# Patient Record
Sex: FEMALE | Race: WHITE | NOT HISPANIC OR LATINO | Employment: FULL TIME | URBAN - METROPOLITAN AREA
[De-identification: names, ages, dates, MRNs, and addresses within clinical notes are randomized per-mention and may not be internally consistent; named-entity substitution may affect disease eponyms.]

---

## 2017-11-10 ENCOUNTER — ALLSCRIPTS OFFICE VISIT (OUTPATIENT)
Dept: OTHER | Facility: OTHER | Age: 37
End: 2017-11-10

## 2017-12-29 ENCOUNTER — GENERIC CONVERSION - ENCOUNTER (OUTPATIENT)
Dept: OTHER | Facility: OTHER | Age: 37
End: 2017-12-29

## 2017-12-29 ENCOUNTER — ALLSCRIPTS OFFICE VISIT (OUTPATIENT)
Dept: OTHER | Facility: OTHER | Age: 37
End: 2017-12-29

## 2018-01-13 VITALS
OXYGEN SATURATION: 99 % | BODY MASS INDEX: 34.17 KG/M2 | DIASTOLIC BLOOD PRESSURE: 86 MMHG | HEART RATE: 68 BPM | WEIGHT: 244.06 LBS | SYSTOLIC BLOOD PRESSURE: 164 MMHG | HEIGHT: 71 IN

## 2018-01-13 NOTE — CONSULTS
Chief Complaint  Chief Complaint Free Text Note Form: Rupture Right Eardrum/Ref by Dr Moisés Cardona      History of Present Illness  HPI: 60-year-old female presents for evaluation of right-sided hearing loss and history of ear infection  She presented to her primary care in October 13 with a right-sided ear infection  She was given cephalexin  She returned on the 30th when her pain had resolved but the hearing loss had not  She was told by her PA that she had a right-sided eardrum perforation  She denies any drainage, further ear pain, or previous ear surgery  Patient works as a psychiatric nurse  Review of Systems  Complete ENT ROS [facility]:   Eyes: No complaints of itching, excessive tearing or vision changes  Ears: ruptured rt eardrum  Nose: No nasal obstruction, no discharge or runniness, no bleeding, no dryness, no sneezing and no loss of smell  Mouth: No sores in mouth, no altered taste, no dental problems  Throat: No complaints of throat pain, no difficulty swallowing, no hoarseness  Neck: No neck soreness, no neck pain, no neck lumps or swelling  Genitourinary: No complaints of dysuria, flank pain or frequent urination  Cardiovascular: No complaints of chest pain or palpitations  Respiratory: No complaints of shortness of breath, cough or wheezing  Gastrointestinal: No complaints of heartburn, nausea/vomiting, or constipation  Neurological: No complaints of headache, convulsions or memory loss  ROS Reviewed:   ROS reviewed  Past Medical History  Past Medical History Reviewed: The past medical history was reviewed and updated today  Surgical History  Surgical History Reviewed: The surgical history was reviewed and updated today  Family History  Family History Reviewed: The family history was reviewed and updated today  Social History    · Nonsmoker (V49 89) (Z78 9)  Social History Reviewed: The social history was reviewed and updated today   The social history was reviewed and is unchanged  Current Meds   1  DilTIAZem HCl  MG Oral Capsule Extended Release 24 Hour; Therapy: [id number] to Recorded   2  Synthroid 137 MCG Oral Tablet; Therapy: [id number] to Recorded    Allergies    1  Amoxicillin-Pot Clavulanate TABS   2  Clindamycin    Vitals  Signs   Recorded: [id number] 12:33PM   Heart Rate: 68  Systolic: 836, LUE, Standing  Diastolic: 86, LUE, Standing  Height: 5 ft 11 in  Weight: 244 lb 1 oz  BMI Calculated: 34 04  BSA Calculated: 2 3  O2 Saturation: 99    Physical Exam    Constitutional:   General appearance: Well developed, well nourished  Ability to communicate: Voice normal  Speech normal    Head and Face:   Head and face: Head normocephalic, atraumatic with no lesions or palpable masses  Submandibular glands and parotid glands: non tender, no masses  Eyes:   Test of Ocular Motility: Gaze normal  No nystagmus  Ears:   Otoscopic examination: Abnormal     Hearing: Normal    Nose:   External auditory canals: No cerumen impaction noted, no drainage observed, no edema noted in EAC, no exostoses present, no osteoma present, no tenderness noted  External Inspection of Nose: No deformities observed, no deviation of bone structure, no skin lesion present, no swelling present  Nares are symmetric, no deviation of caudal portion of septum  Nasal Mucosa: No congestion observed, no mucosal lesion or masses present, no ulcerations observed  Cartilaginous Septum: midline, no bleeding noted, no crusting present, no perforation noted  Turbinates: No hypertrophy or inflammation noted  Mouth: Inspection of Lips, Teeth, Gums: Lips normal in color, moist, no cracks or lesions  No loose teeth, no missing teeth  Gingiva: no bleeding observed, no inflammation present  Hard Palate: no asymmetry observed, no torus present  Soft palate normal with no ulcers noted     Throat:   Examination of Oropharynx: Oral Mucosa: no masses, lesions, leukoplakia, or scarring  Normal Luisa's ducts, pink and moist, no discoloration noted  Floor of mouth: normal Warthin's ducts, no lesions, ulcerations, leukoplakia or torus mandibularis  Tonsils: no hypertrophy or ulcerations noted  Tongue: normal mobility, surfaces without fissures, leukoplakia, ulceration or masses, not enlarged, no pallor noted, no white patches present  Neck:   Examination of Neck: No decreased range of motion, trachea midline  Examination of Thyroid: Normal size, non-tender, no palpable masses  Lymphatic:   Palpation of Lymph Nodes: Neck: No generalized lymphadenopathy  Neurological/Psychiatric:   Cranial nerves II-VII grossly intact  Oriented to person, place, and time  Cooperative, in no acute distress  Procedure  Procedure: Nasal endoscopy    Indications: Evaluation of the nasal cavity     Procedure in detail: After informed verbal consent was obtained the nose was anesthetized with topical lidocaine and phenylephrine  After adequate time for anesthesia the nose was evaluated using the endoscope including the middle meatus bilaterally, the inferior and middle turbinates, and the sphenoethmoid recess with the below findings  The patient tolerated the procedure well  Findings: No mucopurulence or polyposis throughout  No lesions or masses  Assessment    1  Nonsmoker (V49 89) (Z78 9)   2  Right chronic serous otitis media (381 10) (H65 21)    Plan    1  Comprehensive Audiogram with Tympanometry; Status:Active; Requested   for:[id number];     Discussion/Summary  Discussion Summary:   We discussed options of tube placement into her right ear  We will wait another 2 weeks as she is not quite to one month after treatment of her upper respiratory tract infection  [facility] and audi[facility]     We discussed Valsalva maneuver to attempt to clear fluid        Signatures   Electronically signed by : EFRA Chandra ; Nov 10 2017  1:13PM EST                       (Author)

## 2018-01-24 VITALS
HEART RATE: 69 BPM | DIASTOLIC BLOOD PRESSURE: 96 MMHG | WEIGHT: 241.06 LBS | BODY MASS INDEX: 33.75 KG/M2 | HEIGHT: 71 IN | OXYGEN SATURATION: 99 % | SYSTOLIC BLOOD PRESSURE: 164 MMHG

## 2018-03-07 NOTE — PROGRESS NOTES
Assessment    1  Right chronic serous otitis media (381 10) (H65 21)    Chief Complaint  Chief Complaint Free Text Note Form: F/U TUBE IN RIGHT EAR/REF BY DR Tami Fleming      History of Present Illness  HPI: 60-year-old female returns for reevaluation after right PE tube placement  She is feeling much better  Her hearing was completely returned  No ear drainage  No ear fullness  Review of Systems  Complete ENT ROS [facility]:   Eyes: No complaints of itching, excessive tearing or vision changes  Ears: F/UI TUBE IN RIGHT EAR  Nose: No nasal obstruction, no discharge or runniness, no bleeding, no dryness, no sneezing and no loss of smell  Mouth: No sores in mouth, no altered taste, no dental problems  Throat: No complaints of throat pain, no difficulty swallowing, no hoarseness  Neck: No neck soreness, no neck pain, no neck lumps or swelling  Genitourinary: No complaints of dysuria, flank pain or frequent urination  Cardiovascular: No complaints of chest pain or palpitations  Respiratory: No complaints of shortness of breath, cough or wheezing  Gastrointestinal: No complaints of heartburn, nausea/vomiting, or constipation  Neurological: No complaints of headache, convulsions or memory loss  ROS Reviewed:   ROS reviewed  Active Problems    1  Right chronic serous otitis media (381 10) (H65 21)    Social History    · Nonsmoker (V49 89) (Z78 9)    Current Meds   1  DilTIAZem HCl  MG Oral Capsule Extended Release 24 Hour; Therapy: [id number] to Recorded   2  Synthroid 137 MCG Oral Tablet; Therapy: 37DII8300 to Recorded    Allergies    1  Amoxicillin-Pot Clavulanate TABS   2   Clindamycin    Vitals  Signs   Recorded: 54Xsm6641 07:51AM   Heart Rate: 69  Systolic: 024, LUE, Sitting  Diastolic: 96, LUE, Sitting  Height: 5 ft 11 in  Weight: 241 lb 1 oz  BMI Calculated: 33 62  BSA Calculated: 2 28  O2 Saturation: 99    Physical Exam    Constitutional:   General appearance: Well developed, well nourished  Ability to communicate: Voice normal  Speech normal    Head and Face:   Head and face: Head normocephalic, atraumatic with no lesions or palpable masses  Submandibular glands and parotid glands: non tender, no masses  Eyes:   Test of Ocular Motility: Gaze normal  No nystagmus  Ears:   Otoscopic Examination: Tympanic membranes intact and normal in appearance, no retraction of tympanic membranes observed, no serous effusion observed, no evidence of tympanosclerosis  PE tube in place in the left anterior inferior quadrant  Hearing: Normal    Nose:   External auditory canals: No cerumen impaction noted, no drainage observed, no edema noted in EAC, no exostoses present, no osteoma present, no tenderness noted  External Inspection of Nose: No deformities observed, no deviation of bone structure, no skin lesion present, no swelling present  Nares are symmetric, no deviation of caudal portion of septum  Nasal Mucosa: No congestion observed, no mucosal lesion or masses present, no ulcerations observed  Cartilaginous Septum: midline, no bleeding noted, no crusting present, no perforation noted  Turbinates: No hypertrophy or inflammation noted  Mouth: Inspection of Lips, Teeth, Gums: Lips normal in color, moist, no cracks or lesions  No loose teeth, no missing teeth  Gingiva: no bleeding observed, no inflammation present  Hard Palate: no asymmetry observed, no torus present  Soft palate normal with no ulcers noted  Throat:   Examination of Oropharynx: Oral Mucosa: no masses, lesions, leukoplakia, or scarring  Normal Luisa's ducts, pink and moist, no discoloration noted  Floor of mouth: normal Warthin's ducts, no lesions, ulcerations, leukoplakia or torus mandibularis  Tonsils: no hypertrophy or ulcerations noted  Tongue: normal mobility, surfaces without fissures, leukoplakia, ulceration or masses, not enlarged, no pallor noted, no white patches present     Neck:   Examination of Neck: No decreased range of motion, trachea midline  Examination of Thyroid: Normal size, non-tender, no palpable masses  Lymphatic:   Palpation of Lymph Nodes: Neck: No generalized lymphadenopathy  Neurological/Psychiatric:   Cranial nerves II-VII grossly intact  Oriented to person, place, and time  Cooperative, in no acute distress  Discussion/Summary  Discussion Summary:   She is doing very well  We will see her back in 6 months for reevaluation or sooner should she have any further problems        Signatures   Electronically signed by : Shelli Boas, M D ; Dec 29 2017  8:24AM EST                       (Author)

## 2018-03-07 NOTE — CONSULTS
Plan    1  Comprehensive Audiogram with Tympanometry; Status:Active; Requested   for:10Nov2017;     Assessment    1  Nonsmoker (V49 89) (Z78 9)   2  Right chronic serous otitis media (381 10) (N69 21)    Chief Complaint  Chief Complaint Free Text Note Form: Rupture Right Eardrum/Ref by Dr Brenda Mathis      History of Present Illness  HPI: 64-year-old female presents for evaluation of right-sided hearing loss and history of ear infection  She presented to her primary care in October 13 with a right-sided ear infection  She was given cephalexin  She returned on the 30th when her pain had resolved but the hearing loss had not  She was told by her PA that she had a right-sided eardrum perforation  She denies any drainage, further ear pain, or previous ear surgery  Patient works as a psychiatric nurse  Review of Systems  Complete ENT ROS [facility]:   Eyes: No complaints of itching, excessive tearing or vision changes  Ears: ruptured rt eardrum  Nose: No nasal obstruction, no discharge or runniness, no bleeding, no dryness, no sneezing and no loss of smell  Mouth: No sores in mouth, no altered taste, no dental problems  Throat: No complaints of throat pain, no difficulty swallowing, no hoarseness  Neck: No neck soreness, no neck pain, no neck lumps or swelling  Genitourinary: No complaints of dysuria, flank pain or frequent urination  Cardiovascular: No complaints of chest pain or palpitations  Respiratory: No complaints of shortness of breath, cough or wheezing  Gastrointestinal: No complaints of heartburn, nausea/vomiting, or constipation  Neurological: No complaints of headache, convulsions or memory loss  ROS Reviewed:   ROS reviewed  Past Medical History  Past Medical History Reviewed: The past medical history was reviewed and updated today  Surgical History  Surgical History Reviewed: The surgical history was reviewed and updated today         Family History  Family History Reviewed: The family history was reviewed and updated today  Social History    · Nonsmoker (V49 89) (Z78 9)  Social History Reviewed: The social history was reviewed and updated today  The social history was reviewed and is unchanged  Current Meds   1  DilTIAZem HCl  MG Oral Capsule Extended Release 24 Hour; Therapy: [id number] to Recorded   2  Synthroid 137 MCG Oral Tablet; Therapy: [id number] to Recorded    Allergies    1  Amoxicillin-Pot Clavulanate TABS   2  Clindamycin    Vitals  Signs   Recorded: [id number] 12:33PM   Heart Rate: 68  Systolic: 161, LUE, Standing  Diastolic: 86, LUE, Standing  Height: 5 ft 11 in  Weight: 244 lb 1 oz  BMI Calculated: 34 04  BSA Calculated: 2 3  O2 Saturation: 99    Physical Exam    Constitutional:   General appearance: Well developed, well nourished  Ability to communicate: Voice normal  Speech normal    Head and Face:   Head and face: Head normocephalic, atraumatic with no lesions or palpable masses  Submandibular glands and parotid glands: non tender, no masses  Eyes:   Test of Ocular Motility: Gaze normal  No nystagmus  Ears:   Otoscopic examination: Abnormal     Hearing: Normal    Nose:   External auditory canals: No cerumen impaction noted, no drainage observed, no edema noted in EAC, no exostoses present, no osteoma present, no tenderness noted  External Inspection of Nose: No deformities observed, no deviation of bone structure, no skin lesion present, no swelling present  Nares are symmetric, no deviation of caudal portion of septum  Nasal Mucosa: No congestion observed, no mucosal lesion or masses present, no ulcerations observed  Cartilaginous Septum: midline, no bleeding noted, no crusting present, no perforation noted  Turbinates: No hypertrophy or inflammation noted  Mouth: Inspection of Lips, Teeth, Gums: Lips normal in color, moist, no cracks or lesions  No loose teeth, no missing teeth   Gingiva: no bleeding observed, no inflammation present  Hard Palate: no asymmetry observed, no torus present  Soft palate normal with no ulcers noted  Throat:   Examination of Oropharynx: Oral Mucosa: no masses, lesions, leukoplakia, or scarring  Normal Luisa's ducts, pink and moist, no discoloration noted  Floor of mouth: normal Warthin's ducts, no lesions, ulcerations, leukoplakia or torus mandibularis  Tonsils: no hypertrophy or ulcerations noted  Tongue: normal mobility, surfaces without fissures, leukoplakia, ulceration or masses, not enlarged, no pallor noted, no white patches present  Neck:   Examination of Neck: No decreased range of motion, trachea midline  Examination of Thyroid: Normal size, non-tender, no palpable masses  Lymphatic:   Palpation of Lymph Nodes: Neck: No generalized lymphadenopathy  Neurological/Psychiatric:   Cranial nerves II-VII grossly intact  Oriented to person, place, and time  Cooperative, in no acute distress  Procedure  Procedure: Nasal endoscopy    Indications: Evaluation of the nasal cavity     Procedure in detail: After informed verbal consent was obtained the nose was anesthetized with topical lidocaine and phenylephrine  After adequate time for anesthesia the nose was evaluated using the endoscope including the middle meatus bilaterally, the inferior and middle turbinates, and the sphenoethmoid recess with the below findings  The patient tolerated the procedure well  Findings: No mucopurulence or polyposis throughout  No lesions or masses  Discussion/Summary  Discussion Summary:   We discussed options of tube placement into her right ear  We will wait another 2 weeks as she is not quite to one month after treatment of her upper respiratory tract infection  [place] for possible right-sided PE tube placement and audiogram     We discussed Valsalva maneuver to attempt to clear fluid        Signatures   Electronically signed by : Marylee Burton, M D ; Nov 10 2017  1:13PM EST                       (Author)

## 2018-06-26 RX ORDER — ASPIRIN 81 MG/1
81 TABLET, CHEWABLE ORAL
COMMUNITY

## 2018-06-26 RX ORDER — DILTIAZEM HYDROCHLORIDE 240 MG/1
240 CAPSULE, COATED, EXTENDED RELEASE ORAL
COMMUNITY
Start: 2018-03-07 | End: 2018-06-29

## 2018-06-26 RX ORDER — LEVOTHYROXINE SODIUM 0.12 MG/1
125 TABLET ORAL
COMMUNITY
Start: 2018-03-07

## 2018-06-26 RX ORDER — LEVOTHYROXINE SODIUM 137 UG/1
TABLET ORAL
COMMUNITY
Start: 2017-11-10 | End: 2018-06-29

## 2018-06-26 RX ORDER — VALSARTAN AND HYDROCHLOROTHIAZIDE 160; 25 MG/1; MG/1
1 TABLET ORAL
COMMUNITY
Start: 2017-04-05 | End: 2018-06-29

## 2018-06-26 RX ORDER — VALSARTAN AND HYDROCHLOROTHIAZIDE 80; 12.5 MG/1; MG/1
1 TABLET, FILM COATED ORAL
COMMUNITY
Start: 2018-03-07

## 2018-06-26 RX ORDER — AMOXICILLIN 250 MG
CAPSULE ORAL
COMMUNITY
Start: 2010-05-07

## 2018-06-26 RX ORDER — LEVOTHYROXINE SODIUM 112 UG/1
112 TABLET ORAL
COMMUNITY

## 2018-06-29 ENCOUNTER — OFFICE VISIT (OUTPATIENT)
Dept: OTOLARYNGOLOGY | Facility: CLINIC | Age: 38
End: 2018-06-29
Payer: COMMERCIAL

## 2018-06-29 VITALS
HEART RATE: 66 BPM | HEIGHT: 71 IN | BODY MASS INDEX: 33.74 KG/M2 | DIASTOLIC BLOOD PRESSURE: 78 MMHG | RESPIRATION RATE: 14 BRPM | WEIGHT: 241 LBS | SYSTOLIC BLOOD PRESSURE: 132 MMHG

## 2018-06-29 DIAGNOSIS — Z96.22 PATENT PRESSURE EQUALIZATION (PE) TUBE ON RIGHT SIDE: ICD-10-CM

## 2018-06-29 DIAGNOSIS — H92.01 OTALGIA, RIGHT: ICD-10-CM

## 2018-06-29 DIAGNOSIS — H65.21 RIGHT CHRONIC SEROUS OTITIS MEDIA: Primary | ICD-10-CM

## 2018-06-29 PROCEDURE — 69210 REMOVE IMPACTED EAR WAX UNI: CPT | Performed by: NURSE PRACTITIONER

## 2018-06-29 PROCEDURE — 99214 OFFICE O/P EST MOD 30 MIN: CPT | Performed by: NURSE PRACTITIONER

## 2018-06-29 NOTE — PROGRESS NOTES
Priya Mercado is a 40 y  o female who presents for re-evaluation of right PE tube for history of chronic right serous otitis media  Relates occasional intermittent mosqueda inner ear pain of right ear that's recurring about 3 times per week over the last couple of weeks  Denies otorrhea, left otalgia, hearing changes, fever and ear fullness and pressure  Otherwise relates doing well  No past medical history on file  /78 (BP Location: Right arm, Patient Position: Sitting, Cuff Size: Standard)   Pulse 66   Resp 14   Ht 5' 11" (1 803 m)   Wt 109 kg (241 lb)   BMI 33 61 kg/m²       Physical Exam   Constitutional: Oriented to person, place, and time  Well-developed and well-nourished, no apparent distress, non-toxic appearance  Cooperative, able to hear and answer questions without difficulty  Voice: Normal voice quality  Head: Normocephalic, atraumatic  No scars, masses or lesions  Face: Symmetric, no edema, no sinus tenderness  Eyes: Vision grossly intact, extra-ocular movement intact  Right Ear: External ear normal   Auditory canal is impacted with cerumen  Right TM: patent pe tube in anterior inferior quadrant - working it's way out  No post-auricular erythema or tenderness  Left Ear: External ear normal   Auditory canal clear  No post-auricular erythema or tenderness  Nose: Septum midline, nares clear  Mucosa moist, turbinates well appearing  No crusting, polyps or discharge evident  Oral cavity: Dentition intact  Mucosa moist, lips normal   Tongue mobile, floor of mouth normal   Hard palate unremarkable  No masses or lesions  Oropharynx: Uvula is midline, soft palate normal   Unremarkable oropharyngeal inlet  Tonsils unremarkable  Posterior pharyngeal wall clear  No masses or lesions  Salivary glands:  Parotid glands and submandibular glands symmetric, no enlargement or tenderness  Neck: Normal laryngeal elevation with swallow  Trachea midline  No masses or lesions   No palpable adenopathy  Thyroid: normal in size, unremarkable without tenderness or palpable nodules  Pulmonary/Chest: Normal effort and rate  No respiratory distress  Musculoskeletal: Normal range of motion  Neurological: Cranial nerves 2-12 intact  Skin: Skin is warm and dry  Psychiatric: Normal mood and affect  Procedure: Cerumen debridement of right EAC    Indications: Cerumen impaction of right EAC    Procedure in detail: After informed verbal consent was obtained the ear was visualized using microscopy  Using alligator forceps the cerumen was debrided and the findings below were seen  The patient tolerated the procedure well  FINDINGS: Unable to remove cerumen secondary to patient discomfort  Right TM with patent pe tube working it's way out of the anterior inferior quadrant  A/P:   Right patent PE tube: We discussed ongoing management of right patent PE tube with cerumen surrounding it in setting of intermittent sharp right inner ear otalgia  PE tube placed for history of chronic right serous otitis media  Educated she is experiencing otalgia as tube is likely working it's way out  Reassured it is patent and in place, and no active ear infection  Educated she may experience ear pain and drainage when tube falls out  Educated on water precautions  Follow up in 1 month or sooner with any concerns

## 2018-06-29 NOTE — LETTER
June 29, 2018     Neel Marie, DO  [address]    Patient: Eusebia Breaux   YOB: 1980   Date of Visit: 6/29/2018       Dear Dr Trent Sánchez: Thank you for referring Eusebia Breaux to me for evaluation  Below are my notes for this consultation  If you have questions, please do not hesitate to call me  I look forward to following your patient along with you  Sincerely,        CONCHIS Murdock        CC: No Recipients  CONCHIS Murdock  6/29/2018 10:40 AM  Sign at close encounter  Eusebia Breaux is a 40 y  o female who presents for re-evaluation of right PE tube for history of chronic right serous otitis media  Relates occasional intermittent mosqueda inner ear pain of right ear that's recurring about 3 times per week over the last couple of weeks  Denies otorrhea, left otalgia, hearing changes, fever and ear fullness and pressure  Otherwise relates doing well  No past medical history on file  /78 (BP Location: Right arm, Patient Position: Sitting, Cuff Size: Standard)   Pulse 66   Resp 14   Ht 5' 11" (1 803 m)   Wt 109 kg (241 lb)   BMI 33 61 kg/m²        Physical Exam   Constitutional: Oriented to person, place, and time  Well-developed and well-nourished, no apparent distress, non-toxic appearance  Cooperative, able to hear and answer questions without difficulty  Voice: Normal voice quality  Head: Normocephalic, atraumatic  No scars, masses or lesions  Face: Symmetric, no edema, no sinus tenderness  Eyes: Vision grossly intact, extra-ocular movement intact  Right Ear: External ear normal   Auditory canal is impacted with cerumen  Right TM: patent pe tube in anterior inferior quadrant - working it's way out  No post-auricular erythema or tenderness  Left Ear: External ear normal   Auditory canal clear  No post-auricular erythema or tenderness  Nose: Septum midline, nares clear  Mucosa moist, turbinates well appearing    No crusting, polyps or discharge evident  Oral cavity: Dentition intact  Mucosa moist, lips normal   Tongue mobile, floor of mouth normal   Hard palate unremarkable  No masses or lesions  Oropharynx: Uvula is midline, soft palate normal   Unremarkable oropharyngeal inlet  Tonsils unremarkable  Posterior pharyngeal wall clear  No masses or lesions  Salivary glands:  Parotid glands and submandibular glands symmetric, no enlargement or tenderness  Neck: Normal laryngeal elevation with swallow  Trachea midline  No masses or lesions  No palpable adenopathy  Thyroid: normal in size, unremarkable without tenderness or palpable nodules  Pulmonary/Chest: Normal effort and rate  No respiratory distress  Musculoskeletal: Normal range of motion  Neurological: Cranial nerves 2-12 intact  Skin: Skin is warm and dry  Psychiatric: Normal mood and affect  Procedure: Cerumen debridement of right EAC    Indications: Cerumen impaction of right EAC    Procedure in detail: After informed verbal consent was obtained the ear was visualized using microscopy  Using alligator forceps the cerumen was debrided and the findings below were seen  The patient tolerated the procedure well  FINDINGS: Unable to remove cerumen secondary to patient discomfort  Right TM with patent pe tube working it's way out of the anterior inferior quadrant  A/P:   Right patent PE tube: We discussed ongoing management of right patent PE tube with cerumen surrounding it in setting of intermittent sharp right inner ear otalgia  PE tube placed for history of chronic right serous otitis media  Educated she is experiencing otalgia as tube is likely working it's way out  Reassured it is patent and in place, and no active ear infection  Educated she may experience ear pain and drainage when tube falls out  Educated on water precautions  Follow up in 1 month or sooner with any concerns

## 2018-06-29 NOTE — PROGRESS NOTES
Review of Systems   Constitutional: Negative  HENT: Negative  Eyes: Negative  Respiratory: Negative  Cardiovascular: Negative  Gastrointestinal: Negative  Endocrine: Negative  Genitourinary: Negative  Musculoskeletal: Negative  Skin: Negative  Allergic/Immunologic: Negative  Neurological: Negative  Hematological: Negative  Psychiatric/Behavioral: Negative

## 2019-05-17 ENCOUNTER — APPOINTMENT (OUTPATIENT)
Dept: URGENT CARE | Facility: CLINIC | Age: 39
End: 2019-05-17
Payer: OTHER MISCELLANEOUS

## 2019-05-17 ENCOUNTER — APPOINTMENT (OUTPATIENT)
Dept: RADIOLOGY | Facility: CLINIC | Age: 39
End: 2019-05-17
Payer: OTHER MISCELLANEOUS

## 2019-05-17 DIAGNOSIS — R51.9 FACIAL PAIN, ACUTE: Primary | ICD-10-CM

## 2019-05-17 DIAGNOSIS — R51.9 FACIAL PAIN, ACUTE: ICD-10-CM

## 2019-05-17 PROCEDURE — G0382 LEV 3 HOSP TYPE B ED VISIT: HCPCS | Performed by: PHYSICIAN ASSISTANT

## 2019-05-17 PROCEDURE — 70150 X-RAY EXAM OF FACIAL BONES: CPT

## 2019-05-17 PROCEDURE — 99283 EMERGENCY DEPT VISIT LOW MDM: CPT | Performed by: PHYSICIAN ASSISTANT

## 2021-09-16 DIAGNOSIS — Z12.31 ENCOUNTER FOR SCREENING MAMMOGRAM FOR MALIGNANT NEOPLASM OF BREAST: ICD-10-CM

## 2021-09-27 ENCOUNTER — HOSPITAL ENCOUNTER (OUTPATIENT)
Dept: RADIOLOGY | Facility: CLINIC | Age: 41
Discharge: HOME | End: 2021-09-27
Payer: COMMERCIAL

## 2021-09-27 VITALS — HEIGHT: 71 IN | WEIGHT: 240 LBS | BODY MASS INDEX: 33.6 KG/M2

## 2021-09-27 DIAGNOSIS — Z12.31 ENCOUNTER FOR SCREENING MAMMOGRAM FOR MALIGNANT NEOPLASM OF BREAST: ICD-10-CM

## 2021-09-27 PROCEDURE — 77067 SCR MAMMO BI INCL CAD: CPT

## 2021-09-27 PROCEDURE — 77063 BREAST TOMOSYNTHESIS BI: CPT

## 2022-06-29 ENCOUNTER — OFFICE VISIT (OUTPATIENT)
Dept: URGENT CARE | Facility: MEDICAL CENTER | Age: 42
End: 2022-06-29
Payer: COMMERCIAL

## 2022-06-29 VITALS
TEMPERATURE: 98.6 F | BODY MASS INDEX: 36.4 KG/M2 | WEIGHT: 260 LBS | OXYGEN SATURATION: 99 % | SYSTOLIC BLOOD PRESSURE: 130 MMHG | HEIGHT: 71 IN | DIASTOLIC BLOOD PRESSURE: 80 MMHG | HEART RATE: 94 BPM | RESPIRATION RATE: 18 BRPM

## 2022-06-29 DIAGNOSIS — R39.9 UTI SYMPTOMS: ICD-10-CM

## 2022-06-29 DIAGNOSIS — N39.0 URINARY TRACT INFECTION WITHOUT HEMATURIA, SITE UNSPECIFIED: Primary | ICD-10-CM

## 2022-06-29 LAB
SL AMB  POCT GLUCOSE, UA: ABNORMAL
SL AMB LEUKOCYTE ESTERASE,UA: ABNORMAL
SL AMB POCT BILIRUBIN,UA: ABNORMAL
SL AMB POCT BLOOD,UA: ABNORMAL
SL AMB POCT CLARITY,UA: CLEAR
SL AMB POCT COLOR,UA: YELLOW
SL AMB POCT KETONES,UA: ABNORMAL
SL AMB POCT NITRITE,UA: ABNORMAL
SL AMB POCT PH,UA: 6
SL AMB POCT SPECIFIC GRAVITY,UA: 1
SL AMB POCT URINE PROTEIN: ABNORMAL
SL AMB POCT UROBILINOGEN: 0.2

## 2022-06-29 PROCEDURE — 99212 OFFICE O/P EST SF 10 MIN: CPT | Performed by: PHYSICIAN ASSISTANT

## 2022-06-29 PROCEDURE — 87086 URINE CULTURE/COLONY COUNT: CPT | Performed by: PHYSICIAN ASSISTANT

## 2022-06-29 PROCEDURE — 81002 URINALYSIS NONAUTO W/O SCOPE: CPT | Performed by: PHYSICIAN ASSISTANT

## 2022-06-29 RX ORDER — CIPROFLOXACIN 250 MG/1
TABLET, FILM COATED ORAL
Qty: 7 TABLET | Refills: 0 | Status: SHIPPED | OUTPATIENT
Start: 2022-06-29 | End: 2022-07-06

## 2022-06-29 RX ORDER — FLUCONAZOLE 50 MG/1
TABLET ORAL
Qty: 2 TABLET | Refills: 0 | Status: SHIPPED | OUTPATIENT
Start: 2022-06-29 | End: 2022-06-30

## 2022-06-29 NOTE — PROGRESS NOTES
[employer] [facility]        NAME: Duane Filippo is a 37 y o  female  : 1980    MRN: [id number]  DATE: 2022  TIME: 5:44 PM    Assessment and Plan   Urinary tract infection without hematuria, site unspecified [N39 0]  1  Urinary tract infection without hematuria, site unspecified  ciprofloxacin (CIPRO) 250 mg tablet    fluconazole (DIFLUCAN) 50 mg tablet   2  UTI symptoms  POCT urine dip    Urine culture         Patient Instructions       Follow up with PCP in 3-5 days  Proceed to  ER if symptoms worsen  Chief Complaint     Chief Complaint   Patient presents with    Possible UTI     Right sided flank pain, frequent urination started today          History of Present Illness       Patient presents with right-sided flank pain frequent urination which started earlier today  She is very concerned as she has polycystic kidney disease with a GFR of 10  No fever chills nausea or vomiting or abdominal pain  Review of Systems   Review of Systems   Constitutional: Negative for chills and fever  Gastrointestinal: Negative for abdominal pain, nausea and vomiting  Genitourinary: Positive for flank pain and frequency  Negative for hematuria           Current Medications       Current Outpatient Medications:     aspirin 81 mg chewable tablet, Chew 81 mg, Disp: , Rfl:     ciprofloxacin (CIPRO) 250 mg tablet, 1 tab every 18 hrs x 5 days, Disp: 7 tablet, Rfl: 0    fluconazole (DIFLUCAN) 50 mg tablet, 75 mg as one oral dose, Disp: 2 tablet, Rfl: 0    levothyroxine 112 mcg tablet, Take 112 mcg by mouth, Disp: , Rfl:     levothyroxine 125 mcg tablet, Take 125 mcg by mouth, Disp: , Rfl:     senna-docusate sodium (SENOKOT S) 8 6-50 mg per tablet, Take by mouth, Disp: , Rfl:     valsartan-hydrochlorothiazide (DIOVAN-HCT) 80-12 5 MG per tablet, Take 1 tablet by mouth, Disp: , Rfl:     Current Allergies     Allergies as of 2022 - Reviewed 2022   Allergen Reaction Noted    Amoxicillin Hives     Clindamycin  11/10/2017    Penicillins  12/09/2012    Pravastatin Myalgia 10/22/2010    Ramipril  04/04/2005    Simvastatin Myalgia 10/22/2010            The following portions of the patient's history were reviewed and updated as appropriate: allergies, current medications, past family history, past medical history, past social history, past surgical history and problem list      History reviewed  No pertinent past medical history  History reviewed  No pertinent surgical history  Family History   Problem Relation Age of Onset    No Known Problems Mother     No Known Problems Father     No Known Problems Maternal Grandmother     Skin cancer Maternal Grandfather     Breast cancer Paternal Grandmother     Lung cancer Paternal Grandfather          Medications have been verified  Objective   /80   Pulse 94   Temp 98 6 °F (37 °C)   Resp 18   Ht 5' 11" (1 803 m)   Wt 118 kg (260 lb)   SpO2 99%   BMI 36 26 kg/m²   No LMP recorded  Physical Exam     Physical Exam  Vitals and nursing note reviewed  Constitutional:       Appearance: Normal appearance  HENT:      Head: Normocephalic and atraumatic  Cardiovascular:      Rate and Rhythm: Normal rate  Pulmonary:      Effort: Pulmonary effort is normal    Skin:     General: Skin is warm  Neurological:      Mental Status: She is alert

## 2022-07-01 LAB — BACTERIA UR CULT: NORMAL

## 2022-10-07 ENCOUNTER — HOSPITAL ENCOUNTER (OUTPATIENT)
Dept: RADIOLOGY | Facility: CLINIC | Age: 42
End: 2022-10-07
Payer: COMMERCIAL

## 2022-10-07 VITALS — WEIGHT: 250 LBS | BODY MASS INDEX: 35 KG/M2 | HEIGHT: 71 IN

## 2022-10-07 DIAGNOSIS — Z12.31 ENCOUNTER FOR SCREENING MAMMOGRAM FOR MALIGNANT NEOPLASM OF BREAST: ICD-10-CM

## 2022-10-07 PROCEDURE — 77067 SCR MAMMO BI INCL CAD: CPT

## 2022-10-07 PROCEDURE — 77063 BREAST TOMOSYNTHESIS BI: CPT

## 2023-01-28 ENCOUNTER — OFFICE VISIT (OUTPATIENT)
Dept: URGENT CARE | Facility: CLINIC | Age: 43
End: 2023-01-28

## 2023-01-28 VITALS
TEMPERATURE: 97.6 F | SYSTOLIC BLOOD PRESSURE: 150 MMHG | RESPIRATION RATE: 16 BRPM | OXYGEN SATURATION: 99 % | HEIGHT: 71 IN | HEART RATE: 86 BPM | BODY MASS INDEX: 33.6 KG/M2 | DIASTOLIC BLOOD PRESSURE: 100 MMHG | WEIGHT: 240 LBS

## 2023-01-28 DIAGNOSIS — U07.1 COVID-19: Primary | ICD-10-CM

## 2023-01-28 DIAGNOSIS — I10 ELEVATED BLOOD PRESSURE READING IN OFFICE WITH DIAGNOSIS OF HYPERTENSION: ICD-10-CM

## 2023-01-28 LAB
SARS-COV-2 AG UPPER RESP QL IA: POSITIVE
VALID CONTROL: ABNORMAL

## 2023-01-28 RX ORDER — LOSARTAN POTASSIUM 100 MG/1
TABLET ORAL
COMMUNITY
Start: 2022-12-14

## 2023-01-28 NOTE — LETTER
January 28, 2023     Patient: Lucia Pang   YOB: 1980   Date of Visit: 1/28/2023       To Whom It May Concern: It is my medical opinion that Kishor Tolentino should remain out of work until 2/4/2023  Patient tested positive for COVID-19             Sincerely,        Georgia Johnson PA-C

## 2023-01-28 NOTE — PATIENT INSTRUCTIONS
Today you tested POSITIVE for COVID-19  You may print results from your MyChart (IT help [phone] option 5) or call medical records at [phone]  Self quarantine  Department of health's newest recommendations as of December 27,2021 state the following:    IF you TEST POSITIVE for COVID-19  -stay home for 5 days  If you have no symptoms or your symptoms are resolving after 5 days, you can leave your house  Continue to wear a mask around others for 5 additional days  If you have a fever, continue to stay home until you fever resolves  IF YOU WERE EXPOSED TO SOMEONE WITH COVID 19  -if you have been boosted OR completed the primary series of Pfizer or Moderna vaccine within the last 6 months OR completed the primary series of J&J vaccine within the last 2 months, wear a mask around others for 10 days  Get tested on day 5 if possible  If you develop symptoms, get a test and stay home     -if you completed the primary series of Pfizer or Moderna vaccine over 6 months ago and are NOT boosted OR completed the primary series of J&J over 2 months ago and are NOT boosted OR are unvaccinated, stay home for 5 days  After that continue to wear a mask around others for 5 additional days  If you can not quarantine you must wear a mask for 10 days  Test on day 5 if possible  If you develops symptoms get a test and stay home  Drink lots of fluids to maintain hydration  Do not touch your face, wash hands often, and practice social distancing  There is no treatment for simple outpatient COVID-19 patients however, CDC recommends 2000 units vitamin D3 to boost the immune system  Those with severe illness, older age, or multiple comorbidities may qualify for monoclonal antibody infusions as treatment  The FDA has approved some other medications for treatment of COVID-19  If you test positive for COVID-19, please call your doctor ASAP to see if you qualify    Call your family doctor to have a follow-up appointment in next few days  Go to ER if you began experiencing chest pain, shortness of breath, fever that is not responding to antipyretics or other severe symptoms

## 2023-01-28 NOTE — PROGRESS NOTES
[facility]        NAME: Red Cristina is a 40 y o  female  : 1980    MRN: [id number]  DATE: 2023  TIME: 2:41 PM    Assessment and Plan   COVID-19 [U07 1]  1  COVID-19  Poct Covid 19 Rapid Antigen Test      2  Elevated blood pressure reading in office with diagnosis of hypertension          Patient aware of elevated blood pressure  States this happens when she is sick  Patient will monitor at home  Patient Instructions   Today you tested POSITIVE for COVID-19  You may print results from your [employer] (IT help [phone] option 5) or call medical records at [phone]  [facility] quarantine  Department of health's newest recommendations as of  state the following:    IF you TEST POSITIVE for COVID-19  -stay home for 5 days  If you have no symptoms or your symptoms are resolving after 5 days, you can leave your house  Continue to wear a mask around others for 5 additional days  If you have a fever, continue to stay home until you fever resolves  IF YOU WERE EXPOSED TO SOMEONE WITH COVID 19  -if you have been boosted OR completed the primary series of Pfizer or Moderna vaccine within the last 6 months OR completed the primary series of J&J vaccine within the last 2 months, wear a mask around others for 10 days  Get tested on day 5 if possible  If you develop symptoms, get a test and stay home     -if you completed the primary series of Pfizer or Moderna vaccine over 6 months ago and are NOT boosted OR completed the primary series of J&J over 2 months ago and are NOT boosted OR are unvaccinated, stay home for 5 days  After that continue to wear a mask around others for 5 additional days  If you can not quarantine you must wear a mask for 10 days  Test on day 5 if possible  If you develops symptoms get a test and stay home  Drink lots of fluids to maintain hydration  Do not touch your face, wash hands often, and practice social distancing  There is no treatment for simple outpatient COVID-19 patients however, CDC recommends 2000 units vitamin D3 to boost the immune system  Those with severe illness, older age, or multiple comorbidities may qualify for monoclonal antibody infusions as treatment  The FDA has approved some other medications for treatment of COVID-19  If you test positive for COVID-19, please call your doctor ASAP to see if you qualify  Call your family doctor to have a follow-up appointment in next few days  Go to ER if you began experiencing chest pain, shortness of breath, fever that is not responding to antipyretics or other severe symptoms  Follow up with PCP in 3-5 days  Proceed to  ER if symptoms worsen  Chief Complaint     Chief Complaint   Patient presents with   • Cold Like Symptoms     4 days ago; headache, fever, chills, sore throat-  Tested at home today=postive  Needs testing for work     • Earache     Started Wednesday- bilateral ear pain           History of Present Illness       Patient is a 56-year-old female with significant past medical history of hypertension, hypothyroidism, and CKD presents the office complaining of fevers, chills, headache, sore throat, and ear pain for 4 days  She denies congestion, cough, SOB, CP, nausea, vomiting, abdominal pain or rashes  At home COVID test positive  Patient would like to be retested because she took 2 other COVID test earlier in the week which were negative  Review of Systems   Review of Systems   Constitutional: Positive for chills, fatigue and fever  HENT: Positive for congestion, ear pain, rhinorrhea and sore throat  Respiratory: Negative for cough and shortness of breath  Cardiovascular: Negative for chest pain and palpitations  Gastrointestinal: Negative for abdominal pain, diarrhea, nausea and vomiting  Musculoskeletal: Negative for myalgias  Neurological: Positive for headaches  Negative for dizziness and light-headedness  Current Medications       Current Outpatient Medications:   •  aspirin 81 mg chewable tablet, Chew 81 mg, Disp: , Rfl:   •  levothyroxine 125 mcg tablet, Take 125 mcg by mouth, Disp: , Rfl:   •  senna-docusate sodium (SENOKOT S) 8 6-50 mg per tablet, Take by mouth, Disp: , Rfl:   •  valsartan-hydrochlorothiazide (DIOVAN-HCT) 80-12 5 MG per tablet, Take 1 tablet by mouth, Disp: , Rfl:   •  levothyroxine 112 mcg tablet, Take 112 mcg by mouth (Patient not taking: Reported on 2023), Disp: , Rfl:   •  losartan (COZAAR) 100 MG tablet, , Disp: , Rfl:     Current Allergies     Allergies as of 2023 - Reviewed 2023   Allergen Reaction Noted   • Amoxicillin Hives    • Clindamycin  11/10/2017   • Penicillins  2012   • Pravastatin Myalgia 10/22/2010   • Ramipril  2005   • Simvastatin Myalgia 10/22/2010            The following portions of the patient's history were reviewed and updated as appropriate: allergies, current medications, past family history, past medical history, past social history, past surgical history and problem list      Past Medical History:   Diagnosis Date   • Hypertension    • Kidney disease        Past Surgical History:   Procedure Laterality Date   •  SECTION     • THYROIDECTOMY     • TONSILLECTOMY         Family History   Problem Relation Age of Onset   • No Known Problems Mother    • No Known Problems Father    • No Known Problems Maternal Grandmother    • Skin cancer Maternal Grandfather    • Breast cancer Paternal Grandmother    • Lung cancer Paternal Grandfather          Medications have been verified  Objective   /100   Pulse 86   Temp 97 6 °F (36 4 °C)   Resp 16   Ht 5' 11" (1 803 m)   Wt 109 kg (240 lb)   LMP 2023   SpO2 99%   BMI 33 47 kg/m²   Patient's last menstrual period was 2023  Physical Exam     Physical Exam  Vitals and nursing note reviewed  Constitutional:       Appearance: Normal appearance   She is well-developed  HENT:      Head: Normocephalic and atraumatic  Right Ear: Tympanic membrane, ear canal and external ear normal       Left Ear: Tympanic membrane, ear canal and external ear normal       Nose: Nose normal       Mouth/Throat:      Pharynx: Uvula midline  Eyes:      General: Lids are normal       Conjunctiva/sclera: Conjunctivae normal       Pupils: Pupils are equal, round, and reactive to light  Cardiovascular:      Rate and Rhythm: Normal rate and regular rhythm  Pulses: Normal pulses  Heart sounds: Normal heart sounds  No murmur heard  No friction rub  No gallop  Pulmonary:      Effort: Pulmonary effort is normal       Breath sounds: Normal breath sounds  No wheezing, rhonchi or rales  Musculoskeletal:         General: Normal range of motion  Cervical back: Neck supple  Lymphadenopathy:      Cervical: No cervical adenopathy  Skin:     General: Skin is warm and dry  Capillary Refill: Capillary refill takes less than 2 seconds  Neurological:      Mental Status: She is alert           POC rapid COVID-19 POSITIVE

## 2023-04-22 ENCOUNTER — APPOINTMENT (EMERGENCY)
Dept: NON INVASIVE DIAGNOSTICS | Facility: HOSPITAL | Age: 43
End: 2023-04-22

## 2023-04-22 ENCOUNTER — HOSPITAL ENCOUNTER (EMERGENCY)
Facility: HOSPITAL | Age: 43
Discharge: HOME | End: 2023-04-22
Attending: EMERGENCY MEDICINE

## 2023-04-22 VITALS
HEART RATE: 88 BPM | HEIGHT: 71 IN | TEMPERATURE: 98.1 F | DIASTOLIC BLOOD PRESSURE: 80 MMHG | BODY MASS INDEX: 33.6 KG/M2 | RESPIRATION RATE: 18 BRPM | OXYGEN SATURATION: 98 % | SYSTOLIC BLOOD PRESSURE: 122 MMHG | WEIGHT: 240 LBS

## 2023-04-22 DIAGNOSIS — I80.8 SUPERFICIAL THROMBOPHLEBITIS OF RIGHT UPPER EXTREMITY: ICD-10-CM

## 2023-04-22 DIAGNOSIS — L03.90 CELLULITIS: Primary | ICD-10-CM

## 2023-04-22 LAB
ANION GAP SERPL CALCULATED.3IONS-SCNC: 12 MMOL/L (ref 4–13)
BASOPHILS # BLD AUTO: 0.02 THOUSANDS/ΜL (ref 0–0.1)
BASOPHILS NFR BLD AUTO: 0 % (ref 0–1)
BUN SERPL-MCNC: 55 MG/DL (ref 5–25)
CALCIUM SERPL-MCNC: 9.8 MG/DL (ref 8.4–10.2)
CHLORIDE SERPL-SCNC: 109 MMOL/L (ref 96–108)
CO2 SERPL-SCNC: 18 MMOL/L (ref 21–32)
CREAT SERPL-MCNC: 6.59 MG/DL (ref 0.6–1.3)
EOSINOPHIL # BLD AUTO: 0.2 THOUSAND/ΜL (ref 0–0.61)
EOSINOPHIL NFR BLD AUTO: 3 % (ref 0–6)
ERYTHROCYTE [DISTWIDTH] IN BLOOD BY AUTOMATED COUNT: 13.6 % (ref 11.6–15.1)
GFR SERPL CREATININE-BSD FRML MDRD: 7 ML/MIN/1.73SQ M
GLUCOSE SERPL-MCNC: 76 MG/DL (ref 65–140)
HCT VFR BLD AUTO: 32.6 % (ref 34.8–46.1)
HGB BLD-MCNC: 10.7 G/DL (ref 11.5–15.4)
IMM GRANULOCYTES # BLD AUTO: 0.01 THOUSAND/UL (ref 0–0.2)
IMM GRANULOCYTES NFR BLD AUTO: 0 % (ref 0–2)
LYMPHOCYTES # BLD AUTO: 2.07 THOUSANDS/ΜL (ref 0.6–4.47)
LYMPHOCYTES NFR BLD AUTO: 32 % (ref 14–44)
MCH RBC QN AUTO: 31.5 PG (ref 26.8–34.3)
MCHC RBC AUTO-ENTMCNC: 32.8 G/DL (ref 31.4–37.4)
MCV RBC AUTO: 96 FL (ref 82–98)
MONOCYTES # BLD AUTO: 0.45 THOUSAND/ΜL (ref 0.17–1.22)
MONOCYTES NFR BLD AUTO: 7 % (ref 4–12)
NEUTROPHILS # BLD AUTO: 3.64 THOUSANDS/ΜL (ref 1.85–7.62)
NEUTS SEG NFR BLD AUTO: 58 % (ref 43–75)
NRBC BLD AUTO-RTO: 0 /100 WBCS
PLATELET # BLD AUTO: 135 THOUSANDS/UL (ref 149–390)
PMV BLD AUTO: 10.2 FL (ref 8.9–12.7)
POTASSIUM SERPL-SCNC: 4.3 MMOL/L (ref 3.5–5.3)
RBC # BLD AUTO: 3.4 MILLION/UL (ref 3.81–5.12)
SODIUM SERPL-SCNC: 139 MMOL/L (ref 135–147)
WBC # BLD AUTO: 6.39 THOUSAND/UL (ref 4.31–10.16)

## 2023-04-22 RX ORDER — SULFAMETHOXAZOLE AND TRIMETHOPRIM 800; 160 MG/1; MG/1
1 TABLET ORAL 2 TIMES DAILY
Qty: 14 TABLET | Refills: 0 | Status: SHIPPED | OUTPATIENT
Start: 2023-04-22 | End: 2023-04-22

## 2023-04-22 RX ORDER — SULFAMETHOXAZOLE AND TRIMETHOPRIM 800; 160 MG/1; MG/1
1 TABLET ORAL ONCE
Status: DISCONTINUED | OUTPATIENT
Start: 2023-04-22 | End: 2023-04-22

## 2023-04-22 RX ORDER — SODIUM BICARBONATE 650 MG/1
1300 TABLET ORAL 4 TIMES DAILY
COMMUNITY

## 2023-04-22 RX ORDER — DOXYCYCLINE HYCLATE 100 MG/1
100 CAPSULE ORAL 2 TIMES DAILY
Qty: 14 CAPSULE | Refills: 0 | Status: SHIPPED | OUTPATIENT
Start: 2023-04-22 | End: 2023-04-29

## 2023-04-22 RX ORDER — DOXYCYCLINE HYCLATE 100 MG/1
100 CAPSULE ORAL ONCE
Status: COMPLETED | OUTPATIENT
Start: 2023-04-22 | End: 2023-04-22

## 2023-04-22 RX ORDER — UREA 10 %
800 LOTION (ML) TOPICAL DAILY
COMMUNITY

## 2023-04-22 RX ORDER — NIFEDIPINE 30 MG/1
30 TABLET, FILM COATED, EXTENDED RELEASE ORAL DAILY
COMMUNITY

## 2023-04-22 RX ADMIN — DOXYCYCLINE 100 MG: 100 CAPSULE ORAL at 15:21

## 2023-04-22 NOTE — DISCHARGE INSTRUCTIONS
Return to the ER immediately for any worsening symptoms  Follow up with your pcp for further evaluation and management  Take tylenol for pain control and elevate your arm

## 2023-04-22 NOTE — ED PROVIDER NOTES
History  Chief Complaint   Patient presents with   • Medical Problem     Pt is with right arm inflammation related to recent infusion  Are is red and warm to the touch with a noted hard lump      50-year-old female presents to the ED for evaluation of right arm redness and swelling  Patient states that she was getting a recent infusion several days ago after the infusion she realized that her arm was swollen and red and warm to the touch  She denies any fever or decreased range of motion  Prior to Admission Medications   Prescriptions Last Dose Informant Patient Reported? Taking? NIFEdipine ER (ADALAT CC) 30 MG 24 hr tablet 2023  Yes Yes   Sig: Take 30 mg by mouth daily   NON FORMULARY Past Week  Yes Yes   Sig: Iron infusion for a series of three  Second one was 2023  aspirin 81 mg chewable tablet 2023  Yes Yes   Sig: Chew 81 mg   folic acid (FOLVITE) 089 MCG tablet 2023  Yes Yes   Sig: Take 800 mcg by mouth daily   levothyroxine 125 mcg tablet 2023  Yes Yes   Sig: Take 125 mcg by mouth   losartan (COZAAR) 100 MG tablet 2023  Yes Yes   senna-docusate sodium (SENOKOT S) 8 6-50 mg per tablet 2023  Yes Yes   Sig: Take by mouth   sodium bicarbonate 650 mg tablet 2023  Yes Yes   Sig: Take 1,300 mg by mouth 4 (four) times a day      Facility-Administered Medications: None       Past Medical History:   Diagnosis Date   • Hypertension    • Kidney disease        Past Surgical History:   Procedure Laterality Date   •  SECTION     • THYROIDECTOMY     • TONSILLECTOMY         Family History   Problem Relation Age of Onset   • No Known Problems Mother    • No Known Problems Father    • No Known Problems Maternal Grandmother    • Skin cancer Maternal Grandfather    • Breast cancer Paternal Grandmother    • Lung cancer Paternal Grandfather      I have reviewed and agree with the history as documented      E-Cigarette/Vaping   • E-Cigarette Use Never User E-Cigarette/Vaping Substances     Social History     Tobacco Use   • Smoking status: Never   • Smokeless tobacco: Never   Vaping Use   • Vaping Use: Never used   Substance Use Topics   • Alcohol use: Not Currently   • Drug use: Not Currently       Review of Systems   Constitutional: Negative for chills and fever  HENT: Negative for ear pain and sore throat  Eyes: Negative for pain and visual disturbance  Respiratory: Negative for cough and shortness of breath  Cardiovascular: Negative for chest pain and palpitations  Gastrointestinal: Negative for abdominal pain and vomiting  Genitourinary: Negative for dysuria and hematuria  Musculoskeletal: Positive for myalgias  Negative for arthralgias and back pain  Skin: Positive for rash  Negative for color change  Neurological: Negative for seizures and syncope  All other systems reviewed and are negative  Physical Exam  Physical Exam  Vitals and nursing note reviewed  Constitutional:       General: She is not in acute distress  Appearance: She is well-developed  HENT:      Head: Normocephalic and atraumatic  Eyes:      Conjunctiva/sclera: Conjunctivae normal    Cardiovascular:      Rate and Rhythm: Normal rate and regular rhythm  Pulses: Normal pulses  Heart sounds: Normal heart sounds  No murmur heard  Pulmonary:      Effort: Pulmonary effort is normal  No respiratory distress  Breath sounds: Normal breath sounds  No stridor  No wheezing or rhonchi  Abdominal:      General: Abdomen is flat  Palpations: Abdomen is soft  Tenderness: There is no abdominal tenderness  Musculoskeletal:         General: Swelling and tenderness present  Cervical back: Neck supple  Comments: +RUE with swelling and erythema to the right mid arm from antecubital fossa to mid bicep   Skin:     General: Skin is warm and dry  Capillary Refill: Capillary refill takes less than 2 seconds  Findings: Rash present  Comments: erytyhematous macular rash to the RUE with proximal streaking,    Neurological:      Mental Status: She is alert  Psychiatric:         Mood and Affect: Mood normal          Vital Signs  ED Triage Vitals [04/22/23 1306]   Temperature Pulse Respirations Blood Pressure SpO2   (!) 97 °F (36 1 °C) 95 16 156/96 99 %      Temp Source Heart Rate Source Patient Position - Orthostatic VS BP Location FiO2 (%)   Temporal Monitor -- Left arm --      Pain Score       3           Vitals:    04/22/23 1306 04/22/23 1522   BP: 156/96 122/80   Pulse: 95 88         Visual Acuity      ED Medications  Medications   doxycycline hyclate (VIBRAMYCIN) capsule 100 mg (100 mg Oral Given 4/22/23 1521)       Diagnostic Studies  Results Reviewed     Procedure Component Value Units Date/Time    Basic metabolic panel [[id number]]  (Abnormal) Collected: 04/22/23 1356    Lab Status: Final result Specimen: Blood from Arm, Left Updated: 04/22/23 1420     Sodium 139 mmol/L      Potassium 4 3 mmol/L      Chloride 109 mmol/L      CO2 18 mmol/L      ANION GAP 12 mmol/L      BUN 55 mg/dL      Creatinine 6 59 mg/dL      Glucose 76 mg/dL      Calcium 9 8 mg/dL      eGFR 7 ml/min/1 73sq m     Narrative:      [place] guidelines for Chronic Kidney Disease (CKD):   •  Stage 1 with normal or high GFR (GFR > 90 mL/min/1 73 square meters)  •  Stage 2 Mild CKD (GFR = 60-89 mL/min/1 73 square meters)  •  Stage 3A Moderate CKD (GFR = 45-59 mL/min/1 73 square meters)  •  Stage 3B Moderate CKD (GFR = 30-44 mL/min/1 73 square meters)  •  Stage 4 Severe CKD (GFR = 15-29 mL/min/1 73 square meters)  •  Stage 5 End Stage CKD (GFR <15 mL/min/1 73 square meters)  Note: GFR calculation is accurate only with a steady state creatinine    Blood culture #1 [[id number]] Collected: 04/22/23 1400    Lab Status:  In process Specimen: Blood from Arm, Left Updated: 04/22/23 1402    CBC and differential [[id number]]  (Abnormal) Collected: 04/22/23 1356 Lab Status: Final result Specimen: Blood from Arm, Left Updated: 04/22/23 1402     WBC 6 39 Thousand/uL      RBC 3 40 Million/uL      Hemoglobin 10 7 g/dL      Hematocrit 32 6 %      MCV 96 fL      MCH 31 5 pg      MCHC 32 8 g/dL      RDW 13 6 %      MPV 10 2 fL      Platelets 875 Thousands/uL      nRBC 0 /100 WBCs      Neutrophils Relative 58 %      Immat GRANS % 0 %      Lymphocytes Relative 32 %      Monocytes Relative 7 %      Eosinophils Relative 3 %      Basophils Relative 0 %      Neutrophils Absolute 3 64 Thousands/µL      Immature Grans Absolute 0 01 Thousand/uL      Lymphocytes Absolute 2 07 Thousands/µL      Monocytes Absolute 0 45 Thousand/µL      Eosinophils Absolute 0 20 Thousand/µL      Basophils Absolute 0 02 Thousands/µL     Blood culture #2 [[id number]] Collected: 04/22/23 1356    Lab Status: In process Specimen: Blood from Arm, Left Updated: 04/22/23 1359                 VAS upper limb venous duplex scan, unilateral/limited    (Results Pending)              Procedures  Procedures         ED Course       Patient is stable and had a negative US  Stable for dc home                                       Medical Decision Making  DDx: Superficial thrombophlebitis, DVT, cellulitis, abscess    Amount and/or Complexity of Data Reviewed  Labs: ordered  Risk  Prescription drug management  Disposition  Final diagnoses:   Cellulitis   Superficial thrombophlebitis of right upper extremity     Time reflects when diagnosis was documented in both MDM as applicable and the Disposition within this note     Time User Action Codes Description Comment    4/22/2023  3:09 PM Nish Salgado Add [L03 90] Cellulitis     4/22/2023  3:10 PM Nish Salgado Add [I80 8] Superficial thrombophlebitis of right upper extremity       ED Disposition     ED Disposition   Discharge    Condition   Stable    Date/Time   Sat Apr 22, 2023  3:09 PM    Comment   Madison Weldon discharge to home/self care  Follow-up Information     Follow up With Specialties Details Why Contact Info    Angelic Beckman MD Internal Medicine In 1 week  [place] [address] [address] [place] [address]  [phone]            Discharge Medication List as of 4/22/2023  3:22 PM      START taking these medications    Details   doxycycline hyclate (VIBRAMYCIN) 100 mg capsule Take 1 capsule (100 mg total) by mouth 2 (two) times a day for 7 days, Starting Sat 4/22/2023, Until Sat 4/29/2023, Normal         CONTINUE these medications which have NOT CHANGED    Details   aspirin 81 mg chewable tablet Chew 81 mg, Historical Med      folic acid (FOLVITE) 971 MCG tablet Take 800 mcg by mouth daily, Historical Med      levothyroxine 125 mcg tablet Take 125 mcg by mouth, Starting Wed 3/7/2018, Historical Med      losartan (COZAAR) 100 MG tablet Starting Wed 12/14/2022, Historical Med      NIFEdipine ER (ADALAT CC) 30 MG 24 hr tablet Take 30 mg by mouth daily, Historical Med      NON FORMULARY Iron infusion for a series of three  Second one was 4/18/2023 , Historical Med      senna-docusate sodium (SENOKOT S) 8 6-50 mg per tablet Take by mouth, Starting Fri 5/7/2010, Historical Med      sodium bicarbonate 650 mg tablet Take 1,300 mg by mouth 4 (four) times a day, Historical Med             No discharge procedures on file      PDMP Review     None          ED Provider  Electronically Signed by           Juju Galvez DO  04/22/23 2401

## 2023-04-22 NOTE — Clinical Note
John Zelaya was seen and treated in our emergency department on 4/22/2023  Diagnosis:     Ladarius Rivas  may return to work on return date  She may return on this date: 04/24/2023         If you have any questions or concerns, please don't hesitate to call        Flaquito De La Torre DO    ______________________________           _______________          _______________  Hospital Representative                              Date                                Time

## 2023-04-27 LAB
BACTERIA BLD CULT: NORMAL
BACTERIA BLD CULT: NORMAL

## 2023-09-04 ENCOUNTER — AMB VIDEO VISIT (OUTPATIENT)
Dept: OTHER | Facility: HOSPITAL | Age: 43
End: 2023-09-04

## 2023-09-04 VITALS — TEMPERATURE: 99.5 F | OXYGEN SATURATION: 98 %

## 2023-09-04 DIAGNOSIS — H66.92 LEFT OTITIS MEDIA, UNSPECIFIED OTITIS MEDIA TYPE: Primary | ICD-10-CM

## 2023-09-04 PROCEDURE — ECARE PR SL URGENT CARE VIRTUAL VISIT: Performed by: NURSE PRACTITIONER

## 2023-09-04 RX ORDER — AZITHROMYCIN 250 MG/1
TABLET, FILM COATED ORAL
Qty: 6 TABLET | Refills: 0 | Status: SHIPPED | OUTPATIENT
Start: 2023-09-04 | End: 2023-09-08

## 2023-09-04 NOTE — PROGRESS NOTES
Video Visit - Humera Cox 40 y.o. female MRN: [id number]    REQUIRED DOCUMENTATION:         1. This service was provided via [employer]. 2. Provider located at [address] [place] [address]  [phone].  3. [facility] provider: Martha Joiner, [address] [facility]. 4. Identify all parties in room with patient during [facility] visit:  Patient   5. After connecting through [employer], patient was identified by name and date of birth. Patient was then informed that this was a Telemedicine visit and that the exam was being conducted confidentially over secure lines. My office door was closed. No one else was in the room. Patient acknowledged consent and understanding of privacy and security of the Telemedicine visit. I informed the patient that I have reviewed their record in Epic and presented the opportunity for them to ask any questions regarding the visit today. The patient agreed to participate. This is a 40year old female here today for video visit. She states she has been having ear pain for 2 days. She states she had some congestion prior to to symptoms. She has been having low grade fever. She states it feels full. She states it is the left ear. She states she a doctor at her work look at her ear and they said she "probably needed a antibiotic"  They are unable to prescribe for her. Review of Systems   Constitutional: Positive for fatigue and fever. Negative for activity change and chills. HENT: Positive for ear pain. Respiratory: Negative. Cardiovascular: Negative. Neurological: Negative. Psychiatric/Behavioral: Negative. Vitals:    09/04/23 1000   Temp: 99.5 °F (37.5 °C)   SpO2: 98%     Physical Exam  Constitutional:       General: She is not in acute distress. Appearance: Normal appearance. She is not ill-appearing or toxic-appearing. HENT:      Head: Normocephalic.       Right Ear: External ear normal.      Left Ear: External ear normal.   Pulmonary:      Effort: Pulmonary effort is normal. No respiratory distress. Comments: No cough  Skin:     Comments: No rash on head or neck. Neurological:      Mental Status: She is alert and oriented to person, place, and time. Psychiatric:         Mood and Affect: Mood normal.         Behavior: Behavior normal.         Thought Content: Thought content normal.         Judgment: Judgment normal.       Diagnoses and all orders for this visit:    Left otitis media, unspecified otitis media type  -     azithromycin (ZITHROMAX) 250 mg tablet; Take 2 tablets today then 1 tablet daily x 4 days      Patient Instructions     Will start antibiotic. Take probiotic. Tylenol or motrin as needed. Follow up with PCP if no improvement. Go to ER with any worsening symptoms. Ear Infection   WHAT YOU NEED TO KNOW:   An ear infection is also called otitis media. Blocked or swollen eustachian tubes can cause an infection. Eustachian tubes connect the middle ear to the back of the nose and throat. They drain fluid from the middle ear. You may have a buildup of fluid in your ear. Germs build up in the fluid and infection develops. DISCHARGE INSTRUCTIONS:   Return to the emergency department if:   • You have clear fluid coming from your ear. • You have a stiff neck, headache, and a fever. Call your doctor if:   • You see blood or pus draining from your ear. • Your ear pain gets worse or does not go away, even after treatment. • The outside of your ear is red or swollen. • You are vomiting or have diarrhea. • You have questions or concerns about your condition or care. Medicines: You may  need any of the following:  • Acetaminophen  decreases pain and fever. It is available without a doctor's order. Ask how much to take and how often to take it. Follow directions.  Read the labels of all other medicines you are using to see if they also contain acetaminophen, or ask your doctor or pharmacist. Acetaminophen can cause liver damage if not taken correctly. • NSAIDs , such as ibuprofen, help decrease swelling, pain, and fever. This medicine is available with or without a doctor's order. NSAIDs can cause stomach bleeding or kidney problems in certain people. If you take blood thinner medicine, always ask your healthcare provider if NSAIDs are safe for you. Always read the medicine label and follow directions. • Ear drops  may contain medicine to decrease pain and inflammation. • Antibiotics  help treat a bacterial infection. • Take your medicine as directed. Contact your healthcare provider if you think your medicine is not helping or if you have side effects. Tell your provider if you are allergic to any medicine. Keep a list of the medicines, vitamins, and herbs you take. Include the amounts, and when and why you take them. Bring the list or the pill bottles to follow-up visits. Carry your medicine list with you in case of an emergency. Self-care:   • Apply heat  on your ear for 15 to 20 minutes, 3 to 4 times a day or as directed. You can apply heat with an electric heating pad, hot water bottle, or warm compress. Always put a cloth between your skin and the heat pack to prevent burns. Heat helps decrease pain. • Apply ice  on your ear for 15 to 20 minutes, 3 to 4 times a day for 2 days or as directed. Use an ice pack, or put crushed ice in a plastic bag. Cover it with a towel before you apply it to your ear. Ice decreases swelling and pain. Prevent an ear infection:   • Wash your hands often  to help prevent the spread of germs. Ask everyone in your house to wash their hands with soap and water. Ask them to wash after they use the bathroom or change a diaper. Remind them to wash before they prepare or eat food. • Stay away from people who are ill. Some germs spread easily and quickly through contact.     Follow up with your doctor as directed:  Write down your questions so you remember to ask them during your visits. © Copyright [facility] 2022 Information is for End User's use only and may not be sold, redistributed or otherwise used for commercial purposes. The above information is an  only. It is not intended as medical advice for individual conditions or treatments. Talk to your doctor, nurse or pharmacist before following any medical regimen to see if it is safe and effective for you. Follow up with PCP if not improved, if symptoms are worse, go to the ER.

## 2023-09-04 NOTE — PATIENT INSTRUCTIONS
Will start antibiotic. Take probiotic. Tylenol or motrin as needed. Follow up with PCP if no improvement. Go to ER with any worsening symptoms. Ear Infection   WHAT YOU NEED TO KNOW:   An ear infection is also called otitis media. Blocked or swollen eustachian tubes can cause an infection. Eustachian tubes connect the middle ear to the back of the nose and throat. They drain fluid from the middle ear. You may have a buildup of fluid in your ear. Germs build up in the fluid and infection develops. DISCHARGE INSTRUCTIONS:   Return to the emergency department if:   You have clear fluid coming from your ear. You have a stiff neck, headache, and a fever. Call your doctor if:   You see blood or pus draining from your ear. Your ear pain gets worse or does not go away, even after treatment. The outside of your ear is red or swollen. You are vomiting or have diarrhea. You have questions or concerns about your condition or care. Medicines: You may  need any of the following:  Acetaminophen  decreases pain and fever. It is available without a doctor's order. Ask how much to take and how often to take it. Follow directions. Read the labels of all other medicines you are using to see if they also contain acetaminophen, or ask your doctor or pharmacist. Acetaminophen can cause liver damage if not taken correctly. NSAIDs , such as ibuprofen, help decrease swelling, pain, and fever. This medicine is available with or without a doctor's order. NSAIDs can cause stomach bleeding or kidney problems in certain people. If you take blood thinner medicine, always ask your healthcare provider if NSAIDs are safe for you. Always read the medicine label and follow directions. Ear drops  may contain medicine to decrease pain and inflammation. Antibiotics  help treat a bacterial infection. Take your medicine as directed.   Contact your healthcare provider if you think your medicine is not helping or if you have side effects. Tell your provider if you are allergic to any medicine. Keep a list of the medicines, vitamins, and herbs you take. Include the amounts, and when and why you take them. Bring the list or the pill bottles to follow-up visits. Carry your medicine list with you in case of an emergency. Self-care:   Apply heat  on your ear for 15 to 20 minutes, 3 to 4 times a day or as directed. You can apply heat with an electric heating pad, hot water bottle, or warm compress. Always put a cloth between your skin and the heat pack to prevent burns. Heat helps decrease pain. Apply ice  on your ear for 15 to 20 minutes, 3 to 4 times a day for 2 days or as directed. Use an ice pack, or put crushed ice in a plastic bag. Cover it with a towel before you apply it to your ear. Ice decreases swelling and pain. Prevent an ear infection:   Wash your hands often  to help prevent the spread of germs. Ask everyone in your house to wash their hands with soap and water. Ask them to wash after they use the bathroom or change a diaper. Remind them to wash before they prepare or eat food. Stay away from people who are ill. Some germs spread easily and quickly through contact. Follow up with your doctor as directed:  Write down your questions so you remember to ask them during your visits. © Copyright Brittni Moreno 2022 Information is for End User's use only and may not be sold, redistributed or otherwise used for commercial purposes. The above information is an  only. It is not intended as medical advice for individual conditions or treatments. Talk to your doctor, nurse or pharmacist before following any medical regimen to see if it is safe and effective for you.

## 2023-09-04 NOTE — CARE ANYWHERE EVISITS
Visit Summary for Diego Rivera Kimberly Bautista - Gender: Female - Date of Birth: [id number]  Date: [id number] - Duration: 3 minutes  Patient: Diego Contrerasa  Provider: Sara BALDERRAMA    Patient Contact Information  Address  [address] [place] [address]  [phone]    Visit Topics  Earache [Added [place] - 2023-09-04]    Triage Questions   What is your current physical address in the event of a medical emergency? Answer []  Are you allergic to any medications? Answer []  Are you now or could you be pregnant? Answer []  Do you have any immune system compromise or chronic lung   disease? Answer []  Do you have any vulnerable family members in the home (infant, pregnant, cancer, elderly)? Answer []     Conversation Transcripts  [0A][0A] [Notification] You are connected with Flakita Arriaga, Urgent Care Specialist.[0A][Notification] [facility] is located in [place]. [0A][Notification] [facility] has shared health history. Skip Earl .[0A]    Diagnosis  Otitis media, unspecified, left ear    Procedures  Value: [id number] Code: CPT-4 UNLISTED E&M SERVICE    Medications Prescribed    No prescriptions ordered    Electronically signed by: Flakita Philip(NPI [id number])

## 2023-10-19 ENCOUNTER — HOSPITAL ENCOUNTER (OUTPATIENT)
Dept: RADIOLOGY | Facility: CLINIC | Age: 43
End: 2023-10-19
Payer: COMMERCIAL

## 2023-10-19 VITALS — WEIGHT: 247 LBS | BODY MASS INDEX: 34.58 KG/M2 | HEIGHT: 71 IN

## 2023-10-19 DIAGNOSIS — Z12.31 ENCOUNTER FOR SCREENING MAMMOGRAM FOR MALIGNANT NEOPLASM OF BREAST: ICD-10-CM

## 2023-10-19 PROCEDURE — 77063 BREAST TOMOSYNTHESIS BI: CPT

## 2023-10-19 PROCEDURE — 77067 SCR MAMMO BI INCL CAD: CPT

## 2024-12-16 ENCOUNTER — HOSPITAL ENCOUNTER (OUTPATIENT)
Dept: RADIOLOGY | Facility: CLINIC | Age: 44
Discharge: HOME | End: 2024-12-16
Payer: COMMERCIAL

## 2024-12-16 VITALS — HEIGHT: 71 IN | WEIGHT: 246.91 LBS | BODY MASS INDEX: 34.57 KG/M2

## 2024-12-16 DIAGNOSIS — Z12.31 ENCOUNTER FOR SCREENING MAMMOGRAM FOR MALIGNANT NEOPLASM OF BREAST: ICD-10-CM

## 2024-12-16 PROCEDURE — 77067 SCR MAMMO BI INCL CAD: CPT

## 2024-12-16 PROCEDURE — 77063 BREAST TOMOSYNTHESIS BI: CPT
